# Patient Record
Sex: FEMALE | Race: WHITE | NOT HISPANIC OR LATINO | URBAN - METROPOLITAN AREA
[De-identification: names, ages, dates, MRNs, and addresses within clinical notes are randomized per-mention and may not be internally consistent; named-entity substitution may affect disease eponyms.]

---

## 2019-10-04 ENCOUNTER — TRANSFERRED RECORDS (OUTPATIENT)
Dept: HEALTH INFORMATION MANAGEMENT | Facility: CLINIC | Age: 34
End: 2019-10-04

## 2019-10-04 NOTE — TELEPHONE ENCOUNTER
ONCOLOGY INTAKE: Records Information      APPT INFORMATION:  Referring provider:  Gabriela Rocha  Referring provider s clinic:    Reason for visit/diagnosis:  [facility]  Has patient been notified of appointment date and time?: yes    RECORDS INFORMATION:  Were the records received with the referral (via [employer])? no    Has patient been seen for any external appt for this diagnosis? no

## 2019-10-07 NOTE — TELEPHONE ENCOUNTER
RECORDS STATUS - ALL OTHER DIAGNOSIS      RECORDS RECEIVED FROM: [employer]    DATE RECEIVED: 1/31/2019    NOTES STATUS DETAILS   [facility]   OFFICE NOTE from medical oncologist     DISCHARGE SUMMARY from hospital     DISCHARGE REPORT from the ER     OPERATIVE REPORT     MEDICATION LIST     CLINICAL TRIAL TREATMENTS TO DATE     LABS     PATHOLOGY REPORTS     ANYTHING RELATED TO DIAGNOSIS     GENONOMIC TESTING     TYPE:     IMAGING (NEED IMAGES & REPORT)     CT SCANS     MRI     MAMMO Scheduled 10/4/2019     ULTRASOUND     PET       Action    Action Taken 10/11/2019 9:17am     I called [employer] to request MR to be sent over. Phone: [phone]    They are going to send the records directly to the [employer]s system. The records should appear in the pt's chart sometime tomorrow. 10/12/2019.     There are no outside records in [place]

## 2019-10-11 ENCOUNTER — ANCILLARY PROCEDURE (OUTPATIENT)
Dept: MAMMOGRAPHY | Facility: CLINIC | Age: 34
End: 2019-10-11
Attending: NURSE PRACTITIONER
Payer: COMMERCIAL

## 2019-10-11 DIAGNOSIS — Z12.31 VISIT FOR SCREENING MAMMOGRAM: ICD-10-CM

## 2019-10-11 DIAGNOSIS — Z01.419 ENCOUNTER FOR GYNECOLOGICAL EXAMINATION WITHOUT ABNORMAL FINDING: ICD-10-CM

## 2020-01-31 ENCOUNTER — OFFICE VISIT (OUTPATIENT)
Dept: ONCOLOGY | Facility: CLINIC | Age: 35
End: 2020-01-31
Attending: GENETIC COUNSELOR, MS
Payer: COMMERCIAL

## 2020-01-31 ENCOUNTER — PRE VISIT (OUTPATIENT)
Dept: ONCOLOGY | Facility: CLINIC | Age: 35
End: 2020-01-31

## 2020-01-31 DIAGNOSIS — Z80.3 FAMILY HISTORY OF MALIGNANT NEOPLASM OF BREAST: Primary | ICD-10-CM

## 2020-01-31 PROCEDURE — 96040 ZZH GENETIC COUNSELING, EACH 30 MINUTES: CPT | Mod: ZF | Performed by: GENETIC COUNSELOR, MS

## 2020-01-31 NOTE — LETTER
Cancer Risk Management  Program Locations    [facility]  [facility]  Mailing Address  Cancer Risk Management Program  [facility]  [address]  [facility] [address]  [place] [address]    New patient appointments  [phone]  February 5, 2020    Mary Naranjo  [address]  [facility] [address]      Dear Mary,    It was a pleasure meeting with you at the [facility] on 1/31/2020. Here is a copy of the progress note from your recent genetic counseling visit to the Cancer Risk Management Program. If you have any additional questions, please feel free to call.    Referring Provider: RISHI Kim CNP    Presenting Information:   I met with Mary Naranjo today for genetic counseling at the Cancer Risk Management Program at the [facility] to discuss her family history of breast cancer.  She is here today to review this history, cancer screening recommendations, and available genetic testing options.    Personal History:  Mary is a 34 year old female. She does not have any personal history of cancer.  She had her first menstrual period at age 13 and is premenopause.  Mary has her ovaries, fallopian tubes and uterus in place.  She had a baseline mammogram 10/11/2019 which was negative.  She reports a history of many moles, and is followed by a dermatologist.      Family History: (Please see scanned pedigree for detailed family history information)    Her mother was diagnosed with breast cancer at age 63 and is now 66.  She has completed genetic testing as part of a research study (under Nia Wheeler, PhD,  Medicine), which was negative for harmful mutations (including BRCA1/2) in 2017      Her maternal grandmother was diagnosed with breast cancer at age 38.  She had three sisters who  were also diagnosed with breast cancer    Her paternal grandmother was diagnosed with breast cancer in her 60's-70's, and had a history of lymphoma    Her paternal grandfather passed away in his 60's-70's, and had a history of polycythemia vera    Her paternal ethnicity is Ashkenazi Religious.      Discussion:    Mary's family history of breast cancer is suggestive of a possible hereditary cancer syndrome.    We reviewed the features of sporadic, familial, and hereditary cancers. In looking at Mary's family history, it is possible that a cancer susceptibility gene is present due to her significant maternal history of breast cancer, as well as paternal family history of breast cancer and Ashkenazi Religious ancestry.  Based on her family history, Mary meets current National Comprehensive Cancer Network (NCCN) criteria for genetic testing of high-penetrance breast and/or ovarian cancer susceptibility genes (including BRCA1, BRCA2, CDH1, PALB2, PTEN and TP53, among others).      We discussed the natural history and genetics of Hereditary Breast and Ovarian Cancer syndrome, caused by mutations in the BRCA1/2 genes.  We reviewed that as her mother did completed genetic testing which was negative for these genes, Mary cannot inherit a currently identifiable mutation in the BRCA1/2 genes from her maternal family.  With that being said, we discussed that there are three mutations in the BRCA genes that are more common in the Ashkenazi Religious population. About 1 in 40 individuals of Ashkenazi Religious background have one of these three mutations, which account for about 90% of the BRCA mutations in this population.  Given her paternal family history of breast cancer and Ashkenazi Religious ancestry, it would be appropriate for Mary to consider genetic testing.      A detailed handout regarding hereditary breast cancer and the information we discussed was provided to Mary at the end of our appointment today and  can be found in the after visit summary. Topics included: inheritance pattern, cancer risks, cancer screening recommendations, and also risks, benefits and limitations of testing.    We discussed that there are additional genes that could cause increased risk for breast cancer. As many of these genes present with overlapping features in a family and accurate cancer risk cannot always be established based upon the pedigree analysis alone, it would be reasonable for Mary to consider panel genetic testing to analyze multiple genes at once.    We reviewed genetic testing options for hereditary breast and related cancers: actionable high/moderate breast and gynecologic cancer risk custom panel (CustomNext-Cancer, 19 genes, a combination of GynPlus and BRCAplus + NBN, STK11, and NF1) and expanded high and moderate risk panel (OvaNext, 25 genes, or CancerNext, 34 genes). Mary expressed an interest in learning as much information as possible from the testing, if covered by her insurance.  She elected to first obtain insurance prior authorization for the CancerNext panel, and will return to clinic for a blood draw at a future date.    Genetic testing is available for 34 genes associated with hereditary cancer: CancerNext (APC, DALIA, BARD1, BMPR1A, BRCA1, BRCA2, BRIP1, CDH1, CDK4, CDKN2A, CHEK2, DICER1, EPCAM, GREM1, HOXB13, MLH1, MRE11A, MSH2, MSH6, MUTYH, NBN, NF1, PALB2, PMS2, POLD1, POLE, PTEN, RAD50, RAD51C, RAD51D, SMAD4, SMARCA4, STK11, and TP53).  We discussed that many of the genes in the CancerNext panel are associated with specific hereditary cancer syndromes and published management guidelines: Hereditary Breast and Ovarian Cancer syndrome (BRCA1, BRCA2), Klein syndrome (MLH1, MSH2, MSH6, PMS2, EPCAM), Familial Adenomatous Polyposis (APC), Hereditary Diffuse Gastric Cancer (CDH1), Familial Atypical Multiple Mole Melanoma syndrome (CDK4, CDKN2A), Juvenile Polyposis syndrome (BMPR1A, SMAD4), Cowden syndrome  (PTEN), Li Fraumeni syndrome (TP53), Peutz-Jeghers syndrome (STK11), MUTYH Associated Polyposis (MUTYH), and Neurofibromatosis type 1 (NF1).   The DALIA, BRIP1, CHEK2, GREM1, NBN, PALB2, POLD1, POLE, RAD51C, and RAD51D genes are associated with increased cancer risk and have published management guidelines for certain cancers.    The remaining genes (BARD1, DICER1, HOXB13, MRE11A, RAD50, and SMARCA4) are associated with increased cancer risk and may allow us to make medical recommendations when mutations are identified.    Mary was provided with a detailed brochure from [facility] explaining the CancerNext testing.    Medical Management: For Mary, we reviewed that the information from genetic testing may determine:    additional cancer screening for which Mary may qualify (i.e. mammogram and breast MRI, more frequent colonoscopies, more frequent dermatologic exams, etc.),    options for risk reducing surgeries Mary could consider (i.e. bilateral mastectomy, surgery to remove her ovaries and/or uterus, etc.),      and targeted chemotherapies for certain cancers, should they be indicated in the future (i.e. immunotherapy for individuals with Klein syndrome, PARP inhibitors, etc.).     These recommendations will be discussed in detail once genetic testing is completed.     Plan:  1) Today Mary elected to pursue a benefits analysis through [facility] to better understand her out of pocket cost for the CancerNext gene panel.  2) Mary will be contacted with the expected out of pocket cost when available.  3) If Mary is comfortable with this estimated out of pocket cost, Mary will return to clinic to sign the consent form and have her blood drawn.    Venecia Lennon MS, [facility]  Licensed Genetic Counselor  [phone]

## 2020-01-31 NOTE — PROGRESS NOTES
1/31/2020    Referring Provider: RISHI Kim CNP    Presenting Information:   I met with Mary Naranjo today for genetic counseling at the Cancer Risk Management Program at the [facility] to discuss her family history of breast cancer.  She is here today to review this history, cancer screening recommendations, and available genetic testing options.    Personal History:  Mary is a 34 year old female. She does not have any personal history of cancer.  She had her first menstrual period at age 13 and is premenopause.  Mary has her ovaries, fallopian tubes and uterus in place.  She had a baseline mammogram 10/11/2019 which was negative.  She reports a history of many moles, and is followed by a dermatologist.      Family History: (Please see scanned pedigree for detailed family history information)    Her mother was diagnosed with breast cancer at age 63 and is now 66.  She has completed genetic testing as part of a research study (under Nia Wheeler, PhD, [facility]), which was negative for harmful mutations (including BRCA1/2) in 2017      Her maternal grandmother was diagnosed with breast cancer at age 38.  She had three sisters who were also diagnosed with breast cancer    Her paternal grandmother was diagnosed with breast cancer in her 60's-70's, and had a history of lymphoma    Her paternal grandfather passed away in his 60's-70's, and had a history of polycythemia vera    Her paternal ethnicity is Ashkenazi Mandaeism.      Discussion:    Mary's family history of breast cancer is suggestive of a possible hereditary cancer syndrome.    We reviewed the features of sporadic, familial, and hereditary cancers. In looking at Mary's family history, it is possible that a cancer susceptibility gene is present due to her significant maternal history of breast cancer, as well as paternal family history of breast cancer and Ashkenazi Mandaeism ancestry.  Based on her family history,  Mary meets current National Comprehensive Cancer Network (NCCN) criteria for genetic testing of high-penetrance breast and/or ovarian cancer susceptibility genes (including BRCA1, BRCA2, CDH1, PALB2, PTEN and TP53, among others).      We discussed the natural history and genetics of Hereditary Breast and Ovarian Cancer syndrome, caused by mutations in the BRCA1/2 genes.  We reviewed that as her mother did completed genetic testing which was negative for these genes, Mary cannot inherit a currently identifiable mutation in the BRCA1/2 genes from her maternal family.  With that being said, we discussed that there are three mutations in the BRCA genes that are more common in the Ashkenazi [employer] population. About 1 in 40 individuals of Ashkenazi Methodist background have one of these three mutations, which account for about 90% of the BRCA mutations in this population.  Given her paternal family history of breast cancer and Ashkenazi Methodist ancestry, it would be appropriate for Mary to consider genetic testing.      A detailed handout regarding hereditary breast cancer and the information we discussed was provided to Mary at the end of our appointment today and can be found in the after visit summary. Topics included: inheritance pattern, cancer risks, cancer screening recommendations, and also risks, benefits and limitations of testing.    We discussed that there are additional genes that could cause increased risk for breast cancer. As many of these genes present with overlapping features in a family and accurate cancer risk cannot always be established based upon the pedigree analysis alone, it would be reasonable for Mary to consider panel genetic testing to analyze multiple genes at once.    We reviewed genetic testing options for hereditary breast and related cancers: actionable high/moderate breast and gynecologic cancer risk custom panel (CustomNext-Cancer, 19 genes, a combination of GynPlus  and BRCAplus + NBN, STK11, and NF1) and expanded high and moderate risk panel (OvaNext, 25 genes, or CancerNext, 34 genes). Mary expressed an interest in learning as much information as possible from the testing, if covered by her insurance.  She elected to first obtain insurance prior authorization for the CancerNext panel, and will return to clinic for a blood draw at a future date.    Genetic testing is available for 34 genes associated with hereditary cancer: CancerNext (APC, DALIA, BARD1, BMPR1A, BRCA1, BRCA2, BRIP1, CDH1, CDK4, CDKN2A, CHEK2, DICER1, EPCAM, GREM1, HOXB13, MLH1, MRE11A, MSH2, MSH6, MUTYH, NBN, NF1, PALB2, PMS2, POLD1, POLE, PTEN, RAD50, RAD51C, RAD51D, SMAD4, SMARCA4, STK11, and TP53).  We discussed that many of the genes in the CancerNext panel are associated with specific hereditary cancer syndromes and published management guidelines: Hereditary Breast and Ovarian Cancer syndrome (BRCA1, BRCA2), Klein syndrome (MLH1, MSH2, MSH6, PMS2, EPCAM), Familial Adenomatous Polyposis (APC), Hereditary Diffuse Gastric Cancer (CDH1), Familial Atypical Multiple Mole Melanoma syndrome (CDK4, CDKN2A), Juvenile Polyposis syndrome (BMPR1A, SMAD4), Cowden syndrome (PTEN), Li Fraumeni syndrome (TP53), Peutz-Jeghers syndrome (STK11), MUTYH Associated Polyposis (MUTYH), and Neurofibromatosis type 1 (NF1).   The DALIA, BRIP1, CHEK2, GREM1, NBN, PALB2, POLD1, POLE, RAD51C, and RAD51D genes are associated with increased cancer risk and have published management guidelines for certain cancers.    The remaining genes (BARD1, DICER1, HOXB13, MRE11A, RAD50, and SMARCA4) are associated with increased cancer risk and may allow us to make medical recommendations when mutations are identified.    Mary was provided with a detailed brochure from [employer] explaining the CancerNext testing.    Medical Management: For Mary, we reviewed that the information from genetic testing may determine:    additional cancer  screening for which Mary may qualify (i.e. mammogram and breast MRI, more frequent colonoscopies, more frequent dermatologic exams, etc.),    options for risk reducing surgeries Mary could consider (i.e. bilateral mastectomy, surgery to remove her ovaries and/or uterus, etc.),      and targeted chemotherapies for certain cancers, should they be indicated in the future (i.e. immunotherapy for individuals with Klein syndrome, PARP inhibitors, etc.).     These recommendations will be discussed in detail once genetic testing is completed.     Plan:  1) Today Mary elected to pursue a benefits analysis through [employer] to better understand her out of pocket cost for the CancerNext gene panel.  2) Mary will be contacted with the expected out of pocket cost when available.  3) If Mary is comfortable with this estimated out of pocket cost, Mary will return to clinic to sign the consent form and have her blood drawn.    Face to face time: 45 minutes    Venecia Lennon MS, [facility]  Licensed Genetic Counselor  [phone]

## 2020-01-31 NOTE — PATIENT INSTRUCTIONS
Assessing Cancer Risk  Only about 5-10% of cancers are thought to be due to an inherited cancer susceptibility gene.    These families often have:    Several people with the same or related types of cancer    Cancers diagnosed at a young age (before age 50)    Individuals with more than one primary cancer    Multiple generations of the family affected with cancer    Some people may be candidates for genetic testing of more than one gene.  For these families, genetic testing using a cancer panel may be offered.  These panels will test different genes known to increase the risk for breast, ovarian, uterine, and/or other cancers. All of the genes discussed below have published clinical management guidelines for individuals who are found to carry a mutation. The purpose of this handout is to serve as a brief summary of the genes analyzed by the panels used to inquire about hereditary breast and gynecologic cancer:  DALIA, BRCA1, BRCA2, BRIP1, CDH1, CHEK2, MLH1, MSH2, MSH6, PMS2, EPCAM, PTEN, PALB2, RAD51C, RAD51D, and TP53.  ______________________________________________________________________________  Hereditary Breast and Ovarian Cancer Syndrome   (BRCA1 and BRCA2)  A single mutation in one of the copies of BRCA1 or BRCA2 increases the risk for breast and ovarian cancer, among others.  The risk for pancreatic cancer and melanoma may also be slightly increased in some families.  The chart below shows the chance that someone with a BRCA mutation would develop cancer in his or her lifetime1,2,3,4.        A person s ethnic background is also important to consider, as individuals of Ashkenazi Catholic ancestry have a higher chance of having a BRCA gene mutation.  There are three BRCA mutations that occur more frequently in this population.    Klein Syndrome   (MLH1, MSH2, MSH6, PMS2, and EPCAM)  Currently five genes are known to cause Klein Syndrome: MLH1, MSH2, MSH6, PMS2, and EPCAM.  A single mutation in one of the  Klein Syndrome genes increases the risk for colon, endometrial, ovarian, and stomach cancers.  Other cancers that occur less commonly in Klein Syndrome include urinary tract, skin, and brain cancers.  The chart below shows the chance that a person with Klein syndrome would develop cancer in his or her lifetime5.      *Cancer risk varies depending on Klein syndrome gene found    Cowden Syndrome   (PTEN)  Cowden syndrome is a hereditary condition that increases the risk for breast, thyroid, endometrial, colon, and kidney cancer.  Cowden syndrome is caused by a mutation in the PTEN gene.  A single mutation in one of the copies of PTEN causes Cowden syndrome and increases cancer risk.  The chart below shows the chance that someone with a PTEN mutation would develop cancer in their lifetime6,7.  Other benign features seen in some individuals with Cowden syndrome include benign skin lesions (facial papules, keratoses, lipomas), learning disability, autism, thyroid nodules, colon polyps, and larger head size.      *One recent study found breast cancer risk to be increased to 85%    Li-Fraumeni Syndrome   (TP53)  Li-Fraumeni Syndrome (LFS) is a cancer predisposition syndrome caused by a mutation in the TP53 gene. A single mutation in one of the copies of TP53 increases the risk for multiple cancers. Individuals with LFS are at an increased risk for developing cancer at a young age. The lifetime risk for development of a LFS-associated cancer is 50% by age 30 and 90% by age 60.   Core Cancers: Sarcomas, Breast, Brain, Lung, Leukemias/Lymphomas, Adrenocortical carcinomas  Other Cancers: Gastrointestinal, Thyroid, Skin, Genitourinary    Hereditary Diffuse Gastric Cancer   (CDH1)  Currently, one gene is known to cause hereditary diffuse gastric cancer (HDGC): CDH1.  Individuals with HDGC are at increased risk for diffuse gastric cancer and lobular breast cancer. Of people diagnosed with HDGC, 30-50% have a mutation in the CDH1  gene.  This suggests there are likely other genes that may cause HDGC that have not been identified yet.      Lifetime Cancer Risks    General Population HDGC    Diffuse Gastric  <1% ~80%   Breast 12% 39-52%         Additional Genes  DALIA  DALIA is a moderate-risk breast cancer gene. Women who have a mutation in DALIA can have between a 2-4 fold increased risk for breast cancer compared to the general population8. DALIA mutations have also been associated with increased risk for pancreatic cancer, however an estimate of this cancer risk is not well understood9. Individuals who inherit two DALIA mutations have a condition called ataxia-telangiectasia (AT).  This rare autosomal recessive condition affects the nervous system and immune system, and is associated with progressive cerebellar ataxia beginning in childhood.  Individuals with ataxia-telangiectasia often have a weakened immune system and have an increased risk for childhood cancers.    PALB2  Mutations in PALB2 have been shown to increase the risk of breast cancer up to 33-58% in some families; where individuals fall within this risk range is dependent upon family [id number]. PALB2 mutations have also been associated with increased risk for pancreatic cancer, although this risk has not been quantified yet.  Individuals who inherit two PALB2 mutations--one from their mother and one from their father--have a condition called Fanconi Anemia.  This rare autosomal recessive condition is associated with short stature, developmental delay, bone marrow failure, and increased risk for childhood cancers.    CHEK2   CHEK2 is a moderate-risk breast cancer gene.  Women who have a mutation in CHEK2 have around a 2-fold increased risk for breast cancer compared to the general population, and this risk may be higher depending upon family history.11,12,13 Mutations in CHEK2 have also been shown to increase the risk of a number of other cancers, including colon and prostate, however  these cancer risks are currently not well understood.    BRIP1, RAD51C and RAD51D  Mutations in BRIP1, RAD51C, and RAD51D have been shown to increase the risk of ovarian cancer and possibly female breast cancer as well14,15 .       Lifetime Cancer Risk    General Population BRIP1 RAD51C RAD51D   Ovarian 1-2% ~5-8% ~5-9% ~7-15%           Inheritance  All of the cancer syndromes reviewed above are inherited in an autosomal dominant pattern.  This means that if a parent has a mutation, each of his or her children will have a 50% chance of inheriting that same mutation.  Therefore, each child--male or female--would have a 50% chance of being at increased risk for developing cancer.      Image obtained from Genetics Home Reference, 2013     Mutations in some genes can occur de rogelio, which means that a person s mutation occurred for the first time in them and was not inherited from a parent.  Now that they have the mutation, however, it can be passed on to future generations.    Genetic Testing  Genetic testing involves a blood test and will look at the genetic information in the DALIA, BRCA1, BRCA2, BRIP1, CDH1, CHEK2, MLH1, MSH2, MSH6, PMS2, EPCAM, PTEN, PALB2, RAD51C, RAD51D, and TP53 genes for any harmful mutations that are associated with increased cancer risk.  If possible, it is recommended that the person(s) who has had cancer be tested before other family members.  That person will give us the most useful information about whether or not a specific gene is associated with the cancer in the family.    Results  There are three possible results of genetic testing:    Positive--a harmful mutation was identified in one or more of the genes    Negative--no mutation was identified in any of the genes on this panel    Variant of unknown significance--a variation in one of the genes was identified, but it is unclear how this impacts cancer risk in the family    Advantages and Disadvantages   There are advantages and  disadvantages to genetic testing.    Advantages    May clarify your cancer risk    Can help you make medical decisions    May explain the cancers in your family    May give useful information to your family members (if you share your results)    Disadvantages    Possible negative emotional impact of learning about inherited cancer risk    Uncertainty in interpreting a negative test result in some situations    Possible genetic discrimination concerns (see below)    Genetic Information Nondiscrimination Act (NORIS)  NORIS is a federal law that protects individuals from health insurance or employment discrimination based on a genetic test result alone.  Although rare, there are currently no legal discrimination protections in terms of life insurance, long term care, or disability insurances.  Visit the [employer] [place] website to learn more.    Reducing Cancer Risk  All of the genes described above have nationally recognized cancer screening guidelines that would be recommended for individuals who test positive.  In addition to increased cancer screening, surgeries may be offered or recommended to reduce cancer risk.  Recommendations are based upon an individual s genetic test result as well as their personal and family history of cancer.    Questions to Think About Regarding Genetic Testing:    What effect will the test result have on me and my relationship with my family members if I have an inherited gene mutation?  If I don t have a gene mutation?    Should I share my test results, and how will my family react to this news, which may also affect them?    Are my children ready to learn new information that may one day affect their own health?    Hereditary Cancer Resources    FORCE: Facing Our Risk of Cancer Empowered facingourrisk.org   Bright Pink bebrightpink.org   Li-Fraumeni Syndrome Association lfsassociation.org   PTEN World PTENworld.com   No stomach for cancer, Inc.  nostomachforcancer.org   Stomach cancer relief network Scrnet.org   Collaborative Group of the Americas on Inherited Colorectal Cancer (CGA) cgaicc.com    Cancer Care cancercare.org   American Cancer Society (ACS) cancer.org   National Cancer [place] (NCI) cancer.gov     Please call us if you have any questions or concerns.   Cancer Risk Management Program [phone]-CANCER ([phone])  ? Michael Carrera, MS, [facility] [phone]  ? Charlee Summers, MS, [facility]  [phone]  ? Venecia Lennon, MS, [facility]  [phone]  ? Amna Castro, MS, [facility] [phone]  ? Mary Grace Jenny, MS, [facility] [phone]  ? Alia Reed, MS, [facility]  [phone]  ? Karen Estrada, MS, [facility]  [phone]    References  1. Yoana TODD, Lillie PDP, Mati S, Rajeev CELESTIN, Cami JE, Alberto JL, Maria Luisa N, Marques H, Mark O, Remy A, Margaritoini B, Radidyan P, Manjoãokimelida S, Ferdinand DM, Cohn N, Lexus E, Hernán H, Jose Antonio E, Alvarez J, Gronjuan J, Khanh B, Pramodus H, Thorlacius S, Eerola H, Nevmelidalinna H, Billy K, Lyn OP. Average risks of breast and ovarian cancer associated with BRCA1 or BRCA2 mutations detected in case series unselected for family history: a combined analysis of 222 studies. Am J Hum Jennyfer. 2003;72:1117-30.  2. Otoniel N, Adilene M, Carney G.  BRCA1 and BRCA2 Hereditary Breast and Ovarian Cancer. Gene Reviews online. 2013.  3. Jairon YC, Sharon S, Ben G, Troncoso S. Breast cancer risk among male BRCA1 and BRCA2 mutation carriers. J Natl Cancer Inst. 2007;99:1811-4.  4. Omari BERNARD, Brenden I, Enrique J, Dashawn E, Rosita ER, Mandy F. Risk of breast cancer in male BRCA2 carriers. J Med Jennyfer. 2010;47:710-1.  5. National Comprehensive Cancer Network. Clinical practice guidelines in oncology, colorectal cancer screening. Available online (registration required). 2015.  6. Glen LAFLEUR, Samuel J, Symone J, Carlitos LA, Deepthi DE LOS SANTOS, Gurjit C. Lifetime cancer risks in individuals with germline PTEN mutations. Clin Cancer Res. 2012;18:400-7.  7. Pilarski R. Cowden  Syndrome: A Critical Review of the Clinical Literature. J Jennyfer . 2009:18:13-27.  8. Felix A, Blane D, Soumya S, Cherise P, Yuliana T, Jae M, Alexandre B, Leandro H, Jason R, Lalit K, Yousuf L, Omari DG, Ferdinand D, Gorge DF, Siddharth MR, The Breast Cancer Susceptibility Collaboration (UK) & Graciela MUÑIZ. DALIA mutations that cause ataxia-telangiectasia are breast cancer susceptibility alleles. Nature Genetics. 2006;38:873-875  9. Pedro N , Maura Y, Phylicia J, Charu L, Ambrose GM , Federico ML, Gallinger S, Pittman AG, Syngal S, David ML, Lavern J , Lance R, Williams SZ, Lala JR, Marleny VE, Dee M, Vohermes B, Juanita N, Steven RH, Luigi KW, and Marcella AP. DALIA mutations in patients with hereditary pancreatic cancer. Cancer Discover. 2012;2:41-46  10. Yoana HUNT, et al. Breast-Cancer Risk in Families with Mutations in PALB2. NEJM. 2014; 371(6):497-506.  11. CHEK2 Breast Cancer Case-Control Consortium. CHEK2*1100delC and susceptibility to breast cancer: A collaborative analysis involving 10,860 breast cancer cases and 9,065 controls from 10 studies. Am J Hum Jennyfer, 74 (2004), pp. 8495-3215  12. Sweetie T, Bridget S, Arelis K, et al. Spectrum of Mutations in BRCA1, BRCA2, CHEK2, and TP53 in Families at High Risk of Breast Cancer. SURESH. 2006;295(12):5668-0216.   13. Glenda C, Ashley D, Grady A, et al. Risk of breast cancer in women with a CHEK2 mutation with and without a family history of breast cancer. J Clin Oncol. 2011;29:3426-3379.  14. Emmanuel H, Petr E, Susy SJ, et al. Contribution of germline mutations in the RAD51B, RAD51C, and RAD51D genes to ovarian cancer in the population. J Clin Oncol. 2015;33(26):9359-5216. Doi:10.1200/JCO.2015.61.2408.  15. Maeve T, Tamia BASS, Jenn P, et al. Mutations in BRIP1 confer high risk of ovarian cancer. Alida Jennyfer. 2011;43(11):5971-5264. doi:10.1038/ng.955.

## 2020-01-31 NOTE — LETTER
1/31/2020       RE: Mary Naranjo  [address] [address]     Dear Colleague,    Thank you for referring your patient, Mary Naranjo, to the [facility]. Please see a copy of my visit note below.    1/31/2020    Referring Provider: RISHI Kim CNP    Presenting Information:   I met with Mary Naranjo today for genetic counseling at the Cancer Risk Management Program at the [facility] to discuss her family history of breast cancer.  She is here today to review this history, cancer screening recommendations, and available genetic testing options.    Personal History:  Mary is a 34 year old female. She does not have any personal history of cancer.  She had her first menstrual period at age 13 and is premenopause.  Mary has her ovaries, fallopian tubes and uterus in place.  She had a baseline mammogram 10/11/2019 which was negative.  She reports a history of many moles, and is followed by a dermatologist.      Family History: (Please see scanned pedigree for detailed family history information)    Her mother was diagnosed with breast cancer at age 63 and is now 66.  She has completed genetic testing as part of a research study (under Nia Wheeler, PhD,  Medicine), which was negative for harmful mutations (including BRCA1/2) in 2017      Her maternal grandmother was diagnosed with breast cancer at age 38.  She had three sisters who were also diagnosed with breast cancer    Her paternal grandmother was diagnosed with breast cancer in her 60's-70's, and had a history of lymphoma    Her paternal grandfather passed away in his 60's-70's, and had a history of polycythemia vera    Her paternal ethnicity is Ashkenazi Bahai.      Discussion:    Mary's family history of breast cancer is suggestive of a possible hereditary cancer syndrome.    We reviewed the features of sporadic, familial, and hereditary cancers. In looking at Mary's family  history, it is possible that a cancer susceptibility gene is present due to her significant maternal history of breast cancer, as well as paternal family history of breast cancer and Ashkenazi Latter-day ancestry.  Based on her family history, Mary meets current National Comprehensive Cancer Network (NCCN) criteria for genetic testing of high-penetrance breast and/or ovarian cancer susceptibility genes (including BRCA1, BRCA2, CDH1, PALB2, PTEN and TP53, among others).      We discussed the natural history and genetics of Hereditary Breast and Ovarian Cancer syndrome, caused by mutations in the BRCA1/2 genes.  We reviewed that as her mother did completed genetic testing which was negative for these genes, Mary cannot inherit a currently identifiable mutation in the BRCA1/2 genes from her maternal family.  With that being said, we discussed that there are three mutations in the BRCA genes that are more common in the Ashkenazi Latter-day population. About 1 in 40 individuals of Ashkenazi Latter-day background have one of these three mutations, which account for about 90% of the BRCA mutations in this population.  Given her paternal family history of breast cancer and Ashkenazi Latter-day ancestry, it would be appropriate for Mary to consider genetic testing.      A detailed handout regarding hereditary breast cancer and the information we discussed was provided to Mary at the end of our appointment today and can be found in the after visit summary. Topics included: inheritance pattern, cancer risks, cancer screening recommendations, and also risks, benefits and limitations of testing.    We discussed that there are additional genes that could cause increased risk for breast cancer. As many of these genes present with overlapping features in a family and accurate cancer risk cannot always be established based upon the pedigree analysis alone, it would be reasonable for Mary to consider panel genetic testing to  analyze multiple genes at once.    We reviewed genetic testing options for hereditary breast and related cancers: actionable high/moderate breast and gynecologic cancer risk custom panel (CustomNext-Cancer, 19 genes, a combination of GynPlus and BRCAplus + NBN, STK11, and NF1) and expanded high and moderate risk panel (OvaNext, 25 genes, or CancerNext, 34 genes). Mary expressed an interest in learning as much information as possible from the testing, if covered by her insurance.  She elected to first obtain insurance prior authorization for the CancerNext panel, and will return to clinic for a blood draw at a future date.    Genetic testing is available for 34 genes associated with hereditary cancer: CancerNext (APC, DALIA, BARD1, BMPR1A, BRCA1, BRCA2, BRIP1, CDH1, CDK4, CDKN2A, CHEK2, DICER1, EPCAM, GREM1, HOXB13, MLH1, MRE11A, MSH2, MSH6, MUTYH, NBN, NF1, PALB2, PMS2, POLD1, POLE, PTEN, RAD50, RAD51C, RAD51D, SMAD4, SMARCA4, STK11, and TP53).  We discussed that many of the genes in the CancerNext panel are associated with specific hereditary cancer syndromes and published management guidelines: Hereditary Breast and Ovarian Cancer syndrome (BRCA1, BRCA2), Klein syndrome (MLH1, MSH2, MSH6, PMS2, EPCAM), Familial Adenomatous Polyposis (APC), Hereditary Diffuse Gastric Cancer (CDH1), Familial Atypical Multiple Mole Melanoma syndrome (CDK4, CDKN2A), Juvenile Polyposis syndrome (BMPR1A, SMAD4), Cowden syndrome (PTEN), Li Fraumeni syndrome (TP53), Peutz-Jeghers syndrome (STK11), MUTYH Associated Polyposis (MUTYH), and Neurofibromatosis type 1 (NF1).   The DALIA, BRIP1, CHEK2, GREM1, NBN, PALB2, POLD1, POLE, RAD51C, and RAD51D genes are associated with increased cancer risk and have published management guidelines for certain cancers.    The remaining genes (BARD1, DICER1, HOXB13, MRE11A, RAD50, and SMARCA4) are associated with increased cancer risk and may allow us to make medical recommendations when mutations are  identified.    Mary was provided with a detailed brochure from [employer] explaining the CancerNext testing.    Medical Management: For Mary, we reviewed that the information from genetic testing may determine:    additional cancer screening for which Mary may qualify (i.e. mammogram and breast MRI, more frequent colonoscopies, more frequent dermatologic exams, etc.),    options for risk reducing surgeries Mary could consider (i.e. bilateral mastectomy, surgery to remove her ovaries and/or uterus, etc.),      and targeted chemotherapies for certain cancers, should they be indicated in the future (i.e. immunotherapy for individuals with Klein syndrome, PARP inhibitors, etc.).     These recommendations will be discussed in detail once genetic testing is completed.     Plan:  1) Today Mary elected to pursue a benefits analysis through [employer] to better understand her out of pocket cost for the CancerNext gene panel.  2) Mary will be contacted with the expected out of pocket cost when available.  3) If Mary is comfortable with this estimated out of pocket cost, Mary will return to clinic to sign the consent form and have her blood drawn.    Face to face time: 45 minutes    Venecia Lennon MS, [facility]  Licensed Genetic Counselor  [phone]

## 2020-03-11 ENCOUNTER — HEALTH MAINTENANCE LETTER (OUTPATIENT)
Age: 35
End: 2020-03-11

## 2020-04-01 ENCOUNTER — TELEPHONE (OUTPATIENT)
Dept: ONCOLOGY | Facility: CLINIC | Age: 35
End: 2020-04-01

## 2020-04-01 NOTE — TELEPHONE ENCOUNTER
I called Mary today in order to discuss the pre-authorization for genetic testing (CancerNext hereditary cancer gene panel) that we had submitted through [employer].  Per [employer], the estimated out of pocket cost through insurance for this testing is $100. Her insurance has now approved this testing (authorization date extends through 12/31/2020, Authorization Number: [id number]). I called Mary today in order to discuss this expected out of pocket cost and if she is interested in moving forward with testing this year.  She is encouraged to call me back directly to discuss next steps.      As Mary was not available, I left a voicemail with my contact information and encouraged her to contact me to discus genetic testing options.    Venecia Lennon MS, [facility]  Licensed, Certified Genetic Counselor  [facility]  Phone: [phone]

## 2021-01-03 ENCOUNTER — HEALTH MAINTENANCE LETTER (OUTPATIENT)
Age: 36
End: 2021-01-03

## 2021-04-25 ENCOUNTER — HEALTH MAINTENANCE LETTER (OUTPATIENT)
Age: 36
End: 2021-04-25

## 2021-10-10 ENCOUNTER — HEALTH MAINTENANCE LETTER (OUTPATIENT)
Age: 36
End: 2021-10-10

## 2022-04-21 ENCOUNTER — ANCILLARY PROCEDURE (OUTPATIENT)
Dept: MAMMOGRAPHY | Facility: CLINIC | Age: 37
End: 2022-04-21
Attending: OBSTETRICS & GYNECOLOGY
Payer: COMMERCIAL

## 2022-04-21 DIAGNOSIS — Z12.31 VISIT FOR SCREENING MAMMOGRAM: ICD-10-CM

## 2022-04-21 PROCEDURE — 77063 BREAST TOMOSYNTHESIS BI: CPT | Mod: GC

## 2022-04-21 PROCEDURE — 77067 SCR MAMMO BI INCL CAD: CPT | Mod: GC

## 2022-05-02 ENCOUNTER — ANCILLARY PROCEDURE (OUTPATIENT)
Dept: MAMMOGRAPHY | Facility: CLINIC | Age: 37
End: 2022-05-02
Attending: OBSTETRICS & GYNECOLOGY
Payer: COMMERCIAL

## 2022-05-02 DIAGNOSIS — R92.8 ABNORMAL MAMMOGRAM OF RIGHT BREAST: ICD-10-CM

## 2022-05-02 PROCEDURE — 76642 ULTRASOUND BREAST LIMITED: CPT | Mod: RT | Performed by: RADIOLOGY

## 2022-05-22 ENCOUNTER — HEALTH MAINTENANCE LETTER (OUTPATIENT)
Age: 37
End: 2022-05-22

## 2022-09-18 ENCOUNTER — HEALTH MAINTENANCE LETTER (OUTPATIENT)
Age: 37
End: 2022-09-18

## 2023-05-03 ENCOUNTER — APPOINTMENT (RX ONLY)
Dept: URBAN - METROPOLITAN AREA CLINIC 99 | Facility: CLINIC | Age: 38
Setting detail: DERMATOLOGY
End: 2023-05-03

## 2023-05-03 DIAGNOSIS — D485 NEOPLASM OF UNCERTAIN BEHAVIOR OF SKIN: ICD-10-CM

## 2023-05-03 DIAGNOSIS — T07XXXA ABRASION OR FRICTION BURN OF OTHER, MULTIPLE, AND UNSPECIFIED SITES, WITHOUT MENTION OF INFECTION: ICD-10-CM

## 2023-05-03 DIAGNOSIS — L90.5 SCAR CONDITIONS AND FIBROSIS OF SKIN: ICD-10-CM

## 2023-05-03 DIAGNOSIS — L20.89 OTHER ATOPIC DERMATITIS: ICD-10-CM | Status: STABLE

## 2023-05-03 DIAGNOSIS — Z71.89 OTHER SPECIFIED COUNSELING: ICD-10-CM

## 2023-05-03 DIAGNOSIS — T07XXXA INSECT BITE, NONVENOMOUS, OF OTHER, MULTIPLE, AND UNSPECIFIED SITES, WITHOUT MENTION OF INFECTION: ICD-10-CM

## 2023-05-03 DIAGNOSIS — L57.8 OTHER SKIN CHANGES DUE TO CHRONIC EXPOSURE TO NONIONIZING RADIATION: ICD-10-CM

## 2023-05-03 DIAGNOSIS — D22 MELANOCYTIC NEVI: ICD-10-CM

## 2023-05-03 PROBLEM — D22.62 MELANOCYTIC NEVI OF LEFT UPPER LIMB, INCLUDING SHOULDER: Status: ACTIVE | Noted: 2023-05-03

## 2023-05-03 PROBLEM — D22.72 MELANOCYTIC NEVI OF LEFT LOWER LIMB, INCLUDING HIP: Status: ACTIVE | Noted: 2023-05-03

## 2023-05-03 PROBLEM — S50.812A ABRASION OF LEFT FOREARM, INITIAL ENCOUNTER: Status: ACTIVE | Noted: 2023-05-03

## 2023-05-03 PROBLEM — D22.61 MELANOCYTIC NEVI OF RIGHT UPPER LIMB, INCLUDING SHOULDER: Status: ACTIVE | Noted: 2023-05-03

## 2023-05-03 PROBLEM — D48.5 NEOPLASM OF UNCERTAIN BEHAVIOR OF SKIN: Status: ACTIVE | Noted: 2023-05-03

## 2023-05-03 PROBLEM — L20.84 INTRINSIC (ALLERGIC) ECZEMA: Status: ACTIVE | Noted: 2023-05-03

## 2023-05-03 PROBLEM — D22.71 MELANOCYTIC NEVI OF RIGHT LOWER LIMB, INCLUDING HIP: Status: ACTIVE | Noted: 2023-05-03

## 2023-05-03 PROBLEM — D22.5 MELANOCYTIC NEVI OF TRUNK: Status: ACTIVE | Noted: 2023-05-03

## 2023-05-03 PROBLEM — S40.862A INSECT BITE (NONVENOMOUS) OF LEFT UPPER ARM, INITIAL ENCOUNTER: Status: ACTIVE | Noted: 2023-05-03

## 2023-05-03 PROCEDURE — ? COUNSELING

## 2023-05-03 PROCEDURE — ? ADDITIONAL NOTES

## 2023-05-03 PROCEDURE — ? TREATMENT REGIMEN

## 2023-05-03 PROCEDURE — 99203 OFFICE O/P NEW LOW 30 MIN: CPT

## 2023-05-03 PROCEDURE — ? SUNSCREEN RECOMMENDATIONS

## 2023-05-03 PROCEDURE — ? PRESCRIPTION

## 2023-05-03 RX ORDER — CLOBETASOL PROPIONATE 0.25 MG/G
CREAM TOPICAL
Qty: 60 | Refills: 0 | Status: ERX | COMMUNITY
Start: 2023-05-03

## 2023-05-03 RX ADMIN — CLOBETASOL PROPIONATE: 0.25 CREAM TOPICAL at 00:00

## 2023-05-03 ASSESSMENT — LOCATION ZONE DERM
LOCATION ZONE: TRUNK
LOCATION ZONE: ARM
LOCATION ZONE: LEG
LOCATION ZONE: HAND
LOCATION ZONE: FACE

## 2023-05-03 ASSESSMENT — LOCATION SIMPLE DESCRIPTION DERM
LOCATION SIMPLE: LEFT HAND
LOCATION SIMPLE: RIGHT CLAVICULAR SKIN
LOCATION SIMPLE: LEFT THIGH
LOCATION SIMPLE: RIGHT UPPER BACK
LOCATION SIMPLE: LEFT FOREHEAD
LOCATION SIMPLE: LEFT POSTERIOR UPPER ARM
LOCATION SIMPLE: LEFT BREAST
LOCATION SIMPLE: RIGHT FOREARM
LOCATION SIMPLE: RIGHT THIGH
LOCATION SIMPLE: LEFT UPPER BACK
LOCATION SIMPLE: UPPER BACK
LOCATION SIMPLE: RIGHT BUTTOCK
LOCATION SIMPLE: LEFT FOREARM
LOCATION SIMPLE: RIGHT CHEEK
LOCATION SIMPLE: LEFT CHEEK
LOCATION SIMPLE: RIGHT POSTERIOR THIGH
LOCATION SIMPLE: LOWER BACK
LOCATION SIMPLE: RIGHT PRETIBIAL REGION
LOCATION SIMPLE: RIGHT BACK
LOCATION SIMPLE: LEFT POSTERIOR THIGH
LOCATION SIMPLE: RIGHT SHOULDER
LOCATION SIMPLE: LEFT CALF
LOCATION SIMPLE: RIGHT HAND
LOCATION SIMPLE: LEFT SHOULDER
LOCATION SIMPLE: RIGHT POSTERIOR UPPER ARM

## 2023-05-03 ASSESSMENT — LOCATION DETAILED DESCRIPTION DERM
LOCATION DETAILED: RIGHT DISTAL PRETIBIAL REGION
LOCATION DETAILED: LEFT SUPERIOR MEDIAL FOREHEAD
LOCATION DETAILED: LEFT ANTERIOR DISTAL THIGH
LOCATION DETAILED: SUPERIOR LUMBAR SPINE
LOCATION DETAILED: RIGHT ANTERIOR DISTAL THIGH
LOCATION DETAILED: RIGHT PROXIMAL PRETIBIAL REGION
LOCATION DETAILED: LEFT CENTRAL MALAR CHEEK
LOCATION DETAILED: LEFT PROXIMAL LATERAL POSTERIOR THIGH
LOCATION DETAILED: RIGHT CENTRAL MALAR CHEEK
LOCATION DETAILED: LEFT ULNAR DORSAL HAND
LOCATION DETAILED: RIGHT RADIAL DORSAL HAND
LOCATION DETAILED: RIGHT PROXIMAL POSTERIOR UPPER ARM
LOCATION DETAILED: LEFT SUPERIOR LATERAL UPPER BACK
LOCATION DETAILED: LEFT ANTERIOR PROXIMAL THIGH
LOCATION DETAILED: RIGHT POSTERIOR SHOULDER
LOCATION DETAILED: RIGHT MID-UPPER BACK
LOCATION DETAILED: LEFT PROXIMAL POSTERIOR UPPER ARM
LOCATION DETAILED: LEFT DISTAL DORSAL FOREARM
LOCATION DETAILED: RIGHT PROXIMAL DORSAL FOREARM
LOCATION DETAILED: INFERIOR THORACIC SPINE
LOCATION DETAILED: LEFT PROXIMAL CALF
LOCATION DETAILED: LEFT DISTAL POSTERIOR THIGH
LOCATION DETAILED: LEFT PROXIMAL DORSAL FOREARM
LOCATION DETAILED: RIGHT CLAVICULAR SKIN
LOCATION DETAILED: LEFT LATERAL UPPER BACK
LOCATION DETAILED: RIGHT ANTERIOR PROXIMAL THIGH
LOCATION DETAILED: LEFT POSTERIOR SHOULDER
LOCATION DETAILED: RIGHT DISTAL POSTERIOR THIGH
LOCATION DETAILED: RIGHT SUPERIOR LATERAL UPPER BACK
LOCATION DETAILED: LEFT MEDIAL BREAST 11-12:00 REGION
LOCATION DETAILED: RIGHT MEDIAL BUTTOCK

## 2023-05-03 ASSESSMENT — ITCH NUMERIC RATING SCALE: HOW SEVERE IS YOUR ITCHING?: 0

## 2023-05-03 NOTE — PROCEDURE: TREATMENT REGIMEN
Plan: Cooler showers, gentle cleansers and moisturizers
Detail Level: Zone
Initiate Treatment: clobetasol 0.025 % topical cream : Apply small amount topically to hands bid for two weeks on, one week off. Repeat as needed

## 2023-05-03 NOTE — PROCEDURE: MIPS QUALITY
Quality 226: Preventive Care And Screening: Tobacco Use: Screening And Cessation Intervention: Patient screened for tobacco use and is an ex/non-smoker
Quality 137: Melanoma: Continuity Of Care - Recall System: Documentation of system reason(s) for not entering patient's information into a recall system (eg, melanoma being monitored by another physician provider)
Detail Level: Detailed
Quality 130: Documentation Of Current Medications In The Medical Record: Current Medications Documented
Quality 110: Preventive Care And Screening: Influenza Immunization: Influenza Immunization Administered during Influenza season
Quality 47: Advance Care Plan: Advance Care Planning discussed and documented in the medical record; patient did not wish or was not able to name a surrogate decision maker or provide an advance care plan.
Quality 431: Preventive Care And Screening: Unhealthy Alcohol Use - Screening: Patient not identified as an unhealthy alcohol user when screened for unhealthy alcohol use using a systematic screening method

## 2023-05-03 NOTE — HPI: RASH
What Type Of Note Output Would You Prefer (Optional)?: Standard Output
Is The Patient Presenting As Previously Scheduled?: Yes
How Severe Is Your Rash?: moderate
Is This A New Presentation, Or A Follow-Up?: Rash
Additional History: Patient reports dry, rough itchy skin during the winter months and she has been prescribed Clobetasol 0.05% cream in past

## 2023-06-04 ENCOUNTER — HEALTH MAINTENANCE LETTER (OUTPATIENT)
Age: 38
End: 2023-06-04

## 2024-07-14 ENCOUNTER — HEALTH MAINTENANCE LETTER (OUTPATIENT)
Age: 39
End: 2024-07-14